# Patient Record
Sex: MALE | Race: OTHER | HISPANIC OR LATINO | Employment: OTHER | URBAN - METROPOLITAN AREA
[De-identification: names, ages, dates, MRNs, and addresses within clinical notes are randomized per-mention and may not be internally consistent; named-entity substitution may affect disease eponyms.]

---

## 2017-12-26 ENCOUNTER — ESTABLISHED PATIENT (OUTPATIENT)
Dept: URBAN - METROPOLITAN AREA CLINIC 35 | Facility: CLINIC | Age: 52
End: 2017-12-26

## 2017-12-26 DIAGNOSIS — H04.123: ICD-10-CM

## 2017-12-26 DIAGNOSIS — H00.023: ICD-10-CM

## 2017-12-26 DIAGNOSIS — H00.026: ICD-10-CM

## 2017-12-26 PROCEDURE — 92015 DETERMINE REFRACTIVE STATE: CPT

## 2017-12-26 PROCEDURE — 92004 COMPRE OPH EXAM NEW PT 1/>: CPT

## 2017-12-26 ASSESSMENT — VISUAL ACUITY
OS_CC: 20/20
OD_CC: 20/25
OD_CC: J3
OS_CC: J5

## 2017-12-26 ASSESSMENT — KERATOMETRY
OS_K2POWER_DIOPTERS: 44.25
OD_K2POWER_DIOPTERS: 45.00
OD_K1POWER_DIOPTERS: 45.25
OS_K1POWER_DIOPTERS: 44.75

## 2017-12-26 ASSESSMENT — TONOMETRY
OD_IOP_MMHG: 17
OS_IOP_MMHG: 16

## 2020-09-16 NOTE — PATIENT DISCUSSION
Recommend Mini lower lift, - platysmaplasty, post-tragel, SMAS to left chin and left cheek(discussed risks and benefits of sx. .. ).

## 2020-09-21 NOTE — PATIENT DISCUSSION
This visual field clearly demonstrated a minimum of 49% loss of upper field of vision OU, with upper lid skin in repose and elevated by taping of the lid to demonstrate potential correction. This field shows that taping the lids significantly improved this patient's superior field of vision by approximately 26%, OU.